# Patient Record
Sex: FEMALE
[De-identification: names, ages, dates, MRNs, and addresses within clinical notes are randomized per-mention and may not be internally consistent; named-entity substitution may affect disease eponyms.]

---

## 2022-07-18 PROBLEM — Z00.00 ENCOUNTER FOR PREVENTIVE HEALTH EXAMINATION: Status: ACTIVE | Noted: 2022-07-18

## 2022-08-03 ENCOUNTER — APPOINTMENT (OUTPATIENT)
Dept: OBGYN | Facility: CLINIC | Age: 27
End: 2022-08-03

## 2022-08-03 VITALS
HEIGHT: 63 IN | DIASTOLIC BLOOD PRESSURE: 67 MMHG | WEIGHT: 119 LBS | SYSTOLIC BLOOD PRESSURE: 105 MMHG | BODY MASS INDEX: 21.09 KG/M2

## 2022-08-03 DIAGNOSIS — Z30.40 ENCOUNTER FOR SURVEILLANCE OF CONTRACEPTIVES, UNSPECIFIED: ICD-10-CM

## 2022-08-03 PROCEDURE — 99202 OFFICE O/P NEW SF 15 MIN: CPT

## 2022-08-03 PROCEDURE — 99203 OFFICE O/P NEW LOW 30 MIN: CPT

## 2022-08-03 RX ORDER — NORETHINDRONE ACETATE AND ETHINYL ESTRADIOL 1MG-20(24)
1-20 KIT ORAL
Qty: 1 | Refills: 3 | Status: ACTIVE | COMMUNITY
Start: 2022-08-03 | End: 1900-01-01

## 2022-08-03 NOTE — END OF VISIT
[FreeTextEntry3] : I, Norman Waltersori acted as a scribe on behalf of Dr. Richards on 08/03/2022 \par \par All medical entries made by the scribe were at my, Dr. Richards, direction and personally dictated by me on 08/03/2022. I have reviewed the chart and agree that the record accurately reflects my personal performance of the history, physical exam, assessment and plan. I have also personally directed, reviewed, and agreed with the chart.\par

## 2022-08-03 NOTE — PLAN
[FreeTextEntry1] : 26 year old female presents for new pt to discuss OCP refill.\par \par 1. Rx OCP\par Discussed safe sexual practice with pt, OCP refilled\par for routine gyn care at new gyn, pt moving next week\par \par Jimena Richards MD

## 2022-08-03 NOTE — HISTORY OF PRESENT ILLNESS
[FreeTextEntry1] : 26 year old female presents for new pt to discuss OCP refill.  States she had pap 1yr ago. Is interested in birth control. Pt states she would like rx OCP. She is currently on [place]. Pt has being off OCP for past 3-4 days. Pt states she is moving next week and would be having a new gyn. Today pt declining  examination.\par Denies hx of migranes/aura, VTE/SSI, states non smoker.